# Patient Record
Sex: MALE | Race: WHITE | Employment: OTHER | URBAN - METROPOLITAN AREA
[De-identification: names, ages, dates, MRNs, and addresses within clinical notes are randomized per-mention and may not be internally consistent; named-entity substitution may affect disease eponyms.]

---

## 2017-01-01 ENCOUNTER — APPOINTMENT (OUTPATIENT)
Dept: GENERAL RADIOLOGY | Age: 72
DRG: 208 | End: 2017-01-01
Attending: INTERNAL MEDICINE
Payer: MEDICARE

## 2017-01-01 ENCOUNTER — APPOINTMENT (OUTPATIENT)
Dept: CT IMAGING | Age: 72
DRG: 208 | End: 2017-01-01
Attending: HOSPITALIST
Payer: MEDICARE

## 2017-01-01 ENCOUNTER — APPOINTMENT (OUTPATIENT)
Dept: GENERAL RADIOLOGY | Age: 72
DRG: 208 | End: 2017-01-01
Attending: HOSPITALIST
Payer: MEDICARE

## 2017-01-01 ENCOUNTER — HOSPITAL ENCOUNTER (INPATIENT)
Age: 72
LOS: 2 days | DRG: 208 | End: 2017-02-22
Attending: INTERNAL MEDICINE | Admitting: INTERNAL MEDICINE
Payer: MEDICARE

## 2017-01-01 ENCOUNTER — APPOINTMENT (OUTPATIENT)
Dept: CT IMAGING | Age: 72
DRG: 208 | End: 2017-01-01
Attending: INTERNAL MEDICINE
Payer: MEDICARE

## 2017-01-01 VITALS
BODY MASS INDEX: 30.4 KG/M2 | OXYGEN SATURATION: 82 % | TEMPERATURE: 99 F | DIASTOLIC BLOOD PRESSURE: 71 MMHG | WEIGHT: 189.15 LBS | HEIGHT: 66 IN | SYSTOLIC BLOOD PRESSURE: 123 MMHG

## 2017-01-01 DIAGNOSIS — C34.92 SMALL CELL CARCINOMA OF LEFT LUNG (HCC): ICD-10-CM

## 2017-01-01 LAB
ALBUMIN SERPL BCP-MCNC: 2.4 G/DL (ref 3.5–5)
ALBUMIN/GLOB SERPL: 0.8 {RATIO} (ref 1.1–2.2)
ALBUMIN/GLOB SERPL: 0.8 {RATIO} (ref 1.1–2.2)
ALBUMIN/GLOB SERPL: 0.9 {RATIO} (ref 1.1–2.2)
ALP SERPL-CCNC: 48 U/L (ref 45–117)
ALP SERPL-CCNC: 52 U/L (ref 45–117)
ALP SERPL-CCNC: 61 U/L (ref 45–117)
ALT SERPL-CCNC: 170 U/L (ref 12–78)
ALT SERPL-CCNC: 54 U/L (ref 12–78)
ALT SERPL-CCNC: 93 U/L (ref 12–78)
AMORPH CRY URNS QL MICRO: ABNORMAL
ANION GAP BLD CALC-SCNC: 13 MMOL/L (ref 5–15)
ANION GAP BLD CALC-SCNC: 14 MMOL/L (ref 5–15)
ANION GAP BLD CALC-SCNC: 6 MMOL/L (ref 5–15)
APPEARANCE FLD: ABNORMAL
APPEARANCE UR: ABNORMAL
APTT PPP: 28.5 SEC (ref 22.1–32.5)
ARTERIAL PATENCY WRIST A: YES
AST SERPL W P-5'-P-CCNC: 107 U/L (ref 15–37)
AST SERPL W P-5'-P-CCNC: 167 U/L (ref 15–37)
AST SERPL W P-5'-P-CCNC: 66 U/L (ref 15–37)
ATRIAL RATE: 107 BPM
BACTERIA URNS QL MICRO: NEGATIVE /HPF
BASE EXCESS BLD CALC-SCNC: 6 MMOL/L
BASOPHILS # BLD AUTO: 0 K/UL (ref 0–0.1)
BASOPHILS # BLD AUTO: 0.1 K/UL (ref 0–0.1)
BASOPHILS # BLD: 0 % (ref 0–1)
BASOPHILS # BLD: 2 % (ref 0–1)
BDY SITE: ABNORMAL
BILIRUB SERPL-MCNC: 0.5 MG/DL (ref 0.2–1)
BILIRUB SERPL-MCNC: 0.7 MG/DL (ref 0.2–1)
BILIRUB SERPL-MCNC: 0.8 MG/DL (ref 0.2–1)
BILIRUB UR QL CFM: NEGATIVE
BNP SERPL-MCNC: 89 PG/ML (ref 0–100)
BUN SERPL-MCNC: 14 MG/DL (ref 6–20)
BUN SERPL-MCNC: 16 MG/DL (ref 6–20)
BUN SERPL-MCNC: 21 MG/DL (ref 6–20)
BUN/CREAT SERPL: 10 (ref 12–20)
BUN/CREAT SERPL: 15 (ref 12–20)
BUN/CREAT SERPL: 15 (ref 12–20)
CALCIUM SERPL-MCNC: 8.2 MG/DL (ref 8.5–10.1)
CALCIUM SERPL-MCNC: 8.4 MG/DL (ref 8.5–10.1)
CALCIUM SERPL-MCNC: 8.5 MG/DL (ref 8.5–10.1)
CALCULATED P AXIS, ECG09: 63 DEGREES
CALCULATED R AXIS, ECG10: -53 DEGREES
CALCULATED T AXIS, ECG11: 44 DEGREES
CHLORIDE SERPL-SCNC: 102 MMOL/L (ref 97–108)
CHLORIDE SERPL-SCNC: 98 MMOL/L (ref 97–108)
CHLORIDE SERPL-SCNC: 99 MMOL/L (ref 97–108)
CO2 SERPL-SCNC: 27 MMOL/L (ref 21–32)
CO2 SERPL-SCNC: 30 MMOL/L (ref 21–32)
CO2 SERPL-SCNC: 35 MMOL/L (ref 21–32)
COLOR FLD: ABNORMAL
COLOR UR: ABNORMAL
CREAT SERPL-MCNC: 0.93 MG/DL (ref 0.7–1.3)
CREAT SERPL-MCNC: 1.41 MG/DL (ref 0.7–1.3)
CREAT SERPL-MCNC: 1.57 MG/DL (ref 0.7–1.3)
DIAGNOSIS, 93000: NORMAL
DIFFERENTIAL METHOD BLD: ABNORMAL
EOSINOPHIL # BLD: 0 K/UL (ref 0–0.4)
EOSINOPHIL # BLD: 0 K/UL (ref 0–0.4)
EOSINOPHIL # BLD: 0.1 K/UL (ref 0–0.4)
EOSINOPHIL # BLD: 0.1 K/UL (ref 0–0.4)
EOSINOPHIL NFR BLD: 0 % (ref 0–7)
EOSINOPHIL NFR BLD: 0 % (ref 0–7)
EOSINOPHIL NFR BLD: 1 % (ref 0–7)
EOSINOPHIL NFR BLD: 3 % (ref 0–7)
EPITH CASTS URNS QL MICRO: ABNORMAL /LPF
ERYTHROCYTE [DISTWIDTH] IN BLOOD BY AUTOMATED COUNT: 15.6 % (ref 11.5–14.5)
ERYTHROCYTE [DISTWIDTH] IN BLOOD BY AUTOMATED COUNT: 15.9 % (ref 11.5–14.5)
ERYTHROCYTE [DISTWIDTH] IN BLOOD BY AUTOMATED COUNT: 16 % (ref 11.5–14.5)
ERYTHROCYTE [DISTWIDTH] IN BLOOD BY AUTOMATED COUNT: 16 % (ref 11.5–14.5)
GAS FLOW.O2 O2 DELIVERY SYS: ABNORMAL L/MIN
GAS FLOW.O2 SETTING OXYMISER: 16 BPM
GLOBULIN SER CALC-MCNC: 2.8 G/DL (ref 2–4)
GLOBULIN SER CALC-MCNC: 2.9 G/DL (ref 2–4)
GLOBULIN SER CALC-MCNC: 3 G/DL (ref 2–4)
GLUCOSE BLD STRIP.AUTO-MCNC: 124 MG/DL (ref 65–100)
GLUCOSE BLD STRIP.AUTO-MCNC: 125 MG/DL (ref 65–100)
GLUCOSE BLD STRIP.AUTO-MCNC: 137 MG/DL (ref 65–100)
GLUCOSE BLD STRIP.AUTO-MCNC: 163 MG/DL (ref 65–100)
GLUCOSE BLD STRIP.AUTO-MCNC: 180 MG/DL (ref 65–100)
GLUCOSE SERPL-MCNC: 113 MG/DL (ref 65–100)
GLUCOSE SERPL-MCNC: 174 MG/DL (ref 65–100)
GLUCOSE SERPL-MCNC: 77 MG/DL (ref 65–100)
GLUCOSE UR STRIP.AUTO-MCNC: NEGATIVE MG/DL
GRAN CASTS URNS QL MICRO: ABNORMAL /LPF
HCO3 BLD-SCNC: 31.6 MMOL/L (ref 22–26)
HCT VFR BLD AUTO: 27 % (ref 36.6–50.3)
HCT VFR BLD AUTO: 30.2 % (ref 36.6–50.3)
HCT VFR BLD AUTO: 31.8 % (ref 36.6–50.3)
HCT VFR BLD AUTO: 47 % (ref 36.6–50.3)
HGB BLD-MCNC: 15.3 G/DL (ref 12.1–17)
HGB BLD-MCNC: 8.5 G/DL (ref 12.1–17)
HGB BLD-MCNC: 9.7 G/DL (ref 12.1–17)
HGB BLD-MCNC: 9.9 G/DL (ref 12.1–17)
HGB UR QL STRIP: ABNORMAL
INR PPP: 1.2 (ref 0.9–1.1)
KETONES UR QL STRIP.AUTO: NEGATIVE MG/DL
LACTATE SERPL-SCNC: 1.4 MMOL/L (ref 0.4–2)
LACTATE SERPL-SCNC: 2 MMOL/L (ref 0.4–2)
LEUKOCYTE ESTERASE UR QL STRIP.AUTO: NEGATIVE
LYMPHOCYTES # BLD AUTO: 14 % (ref 12–49)
LYMPHOCYTES # BLD AUTO: 24 % (ref 12–49)
LYMPHOCYTES # BLD AUTO: 36 % (ref 12–49)
LYMPHOCYTES # BLD AUTO: 37 % (ref 12–49)
LYMPHOCYTES # BLD: 0.8 K/UL (ref 0.8–3.5)
LYMPHOCYTES # BLD: 1.2 K/UL (ref 0.8–3.5)
LYMPHOCYTES # BLD: 1.2 K/UL (ref 0.8–3.5)
LYMPHOCYTES # BLD: 2.1 K/UL (ref 0.8–3.5)
MAGNESIUM SERPL-MCNC: 1.9 MG/DL (ref 1.6–2.4)
MAGNESIUM SERPL-MCNC: 2.2 MG/DL (ref 1.6–2.4)
MCH RBC QN AUTO: 30 PG (ref 26–34)
MCH RBC QN AUTO: 30.5 PG (ref 26–34)
MCH RBC QN AUTO: 30.6 PG (ref 26–34)
MCH RBC QN AUTO: 31.8 PG (ref 26–34)
MCHC RBC AUTO-ENTMCNC: 31.1 G/DL (ref 30–36.5)
MCHC RBC AUTO-ENTMCNC: 31.5 G/DL (ref 30–36.5)
MCHC RBC AUTO-ENTMCNC: 32.1 G/DL (ref 30–36.5)
MCHC RBC AUTO-ENTMCNC: 32.6 G/DL (ref 30–36.5)
MCV RBC AUTO: 95.3 FL (ref 80–99)
MCV RBC AUTO: 95.4 FL (ref 80–99)
MCV RBC AUTO: 97.7 FL (ref 80–99)
MCV RBC AUTO: 97.8 FL (ref 80–99)
METAMYELOCYTES NFR BLD MANUAL: 2 %
METAMYELOCYTES NFR BLD MANUAL: 2 %
METAMYELOCYTES NFR BLD MANUAL: 5 %
MONOCYTES # BLD: 0.1 K/UL (ref 0–1)
MONOCYTES # BLD: 0.6 K/UL (ref 0–1)
MONOCYTES # BLD: 0.6 K/UL (ref 0–1)
MONOCYTES # BLD: 0.8 K/UL (ref 0–1)
MONOCYTES NFR BLD AUTO: 10 % (ref 5–13)
MONOCYTES NFR BLD AUTO: 10 % (ref 5–13)
MONOCYTES NFR BLD AUTO: 23 % (ref 5–13)
MONOCYTES NFR BLD AUTO: 3 % (ref 5–13)
MYELOCYTES NFR BLD MANUAL: 3 %
MYELOCYTES NFR BLD MANUAL: 4 %
MYELOCYTES NFR BLD MANUAL: 6 %
NEUTS BAND NFR BLD MANUAL: 13 % (ref 0–6)
NEUTS BAND NFR BLD MANUAL: 4 %
NEUTS BAND NFR BLD MANUAL: 5 % (ref 0–6)
NEUTS BAND NFR BLD MANUAL: 8 % (ref 0–6)
NEUTS SEG # BLD: 1.2 K/UL (ref 1.8–8)
NEUTS SEG # BLD: 2.4 K/UL (ref 1.8–8)
NEUTS SEG # BLD: 3.1 K/UL (ref 1.8–8)
NEUTS SEG # BLD: 4.1 K/UL (ref 1.8–8)
NEUTS SEG NFR BLD AUTO: 31 % (ref 32–75)
NEUTS SEG NFR BLD AUTO: 37 % (ref 32–75)
NEUTS SEG NFR BLD AUTO: 56 % (ref 32–75)
NEUTS SEG NFR BLD AUTO: 57 % (ref 32–75)
NITRITE UR QL STRIP.AUTO: NEGATIVE
NRBC # BLD: 0.1 K/UL (ref 0–0.01)
NRBC # BLD: 0.4 K/UL (ref 0–0.01)
NRBC BLD-RTO: 1.6 PER 100 WBC
NRBC BLD-RTO: 6.9 PER 100 WBC
NUC CELL # FLD: 43 /CU MM (ref 0–5)
O2/TOTAL GAS SETTING VFR VENT: 80 %
OTHER CELL,FOTHC: 0 %
P-R INTERVAL, ECG05: 126 MS
PCO2 BLD: 57.6 MMHG (ref 35–45)
PEEP RESPIRATORY: 5 CMH2O
PH BLD: 7.35 [PH] (ref 7.35–7.45)
PH UR STRIP: 5 [PH] (ref 5–8)
PHOSPHATE SERPL-MCNC: 3.7 MG/DL (ref 2.6–4.7)
PHOSPHATE SERPL-MCNC: 5.9 MG/DL (ref 2.6–4.7)
PLATELET # BLD AUTO: 131 K/UL (ref 150–400)
PLATELET # BLD AUTO: 170 K/UL (ref 150–400)
PLATELET # BLD AUTO: 207 K/UL (ref 150–400)
PLATELET # BLD AUTO: 238 K/UL (ref 150–400)
PO2 BLD: 80 MMHG (ref 80–100)
POTASSIUM SERPL-SCNC: 3.3 MMOL/L (ref 3.5–5.1)
POTASSIUM SERPL-SCNC: 3.4 MMOL/L (ref 3.5–5.1)
POTASSIUM SERPL-SCNC: 3.6 MMOL/L (ref 3.5–5.1)
PROT SERPL-MCNC: 5.2 G/DL (ref 6.4–8.2)
PROT SERPL-MCNC: 5.3 G/DL (ref 6.4–8.2)
PROT SERPL-MCNC: 5.4 G/DL (ref 6.4–8.2)
PROT UR STRIP-MCNC: 100 MG/DL
PROTHROMBIN TIME: 12.2 SEC (ref 9–11.1)
Q-T INTERVAL, ECG07: 372 MS
QRS DURATION, ECG06: 134 MS
QTC CALCULATION (BEZET), ECG08: 496 MS
RBC # BLD AUTO: 2.83 M/UL (ref 4.1–5.7)
RBC # BLD AUTO: 3.17 M/UL (ref 4.1–5.7)
RBC # BLD AUTO: 3.25 M/UL (ref 4.1–5.7)
RBC # BLD AUTO: 4.81 M/UL (ref 4.1–5.7)
RBC # FLD: >100 /CU MM
RBC #/AREA URNS HPF: ABNORMAL /HPF (ref 0–5)
RBC MORPH BLD: ABNORMAL
SAO2 % BLD: 95 % (ref 92–97)
SERVICE CMNT-IMP: ABNORMAL
SODIUM SERPL-SCNC: 139 MMOL/L (ref 136–145)
SODIUM SERPL-SCNC: 142 MMOL/L (ref 136–145)
SODIUM SERPL-SCNC: 143 MMOL/L (ref 136–145)
SP GR UR REFRACTOMETRY: 1.02 (ref 1–1.03)
SPECIMEN SOURCE FLD: ABNORMAL
SPECIMEN TYPE: ABNORMAL
THERAPEUTIC RANGE,PTTT: NORMAL SECS (ref 58–77)
TOTAL CELLS COUNTED SPEC: 0
TOTAL RESP. RATE, ITRR: 16
TROPONIN I SERPL-MCNC: 0.07 NG/ML
UA: UC IF INDICATED,UAUC: ABNORMAL
UROBILINOGEN UR QL STRIP.AUTO: 1 EU/DL (ref 0.2–1)
VENTILATION MODE VENT: ABNORMAL
VENTRICULAR RATE, ECG03: 107 BPM
VT SETTING VENT: 500 ML
WBC # BLD AUTO: 3.4 K/UL (ref 4.1–11.1)
WBC # BLD AUTO: 4.8 K/UL (ref 4.1–11.1)
WBC # BLD AUTO: 5.8 K/UL (ref 4.1–11.1)
WBC # BLD AUTO: 5.9 K/UL (ref 4.1–11.1)
WBC MORPH BLD: ABNORMAL
WBC NRBC COR # BLD: ABNORMAL 10*3/UL
WBC NRBC COR # BLD: ABNORMAL 10*3/UL
WBC URNS QL MICRO: ABNORMAL /HPF (ref 0–4)

## 2017-01-01 PROCEDURE — 85025 COMPLETE CBC W/AUTO DIFF WBC: CPT | Performed by: INTERNAL MEDICINE

## 2017-01-01 PROCEDURE — 36415 COLL VENOUS BLD VENIPUNCTURE: CPT | Performed by: INTERNAL MEDICINE

## 2017-01-01 PROCEDURE — 71020 XR CHEST PA LAT: CPT

## 2017-01-01 PROCEDURE — 74011636320 HC RX REV CODE- 636/320: Performed by: INTERNAL MEDICINE

## 2017-01-01 PROCEDURE — 74011000250 HC RX REV CODE- 250: Performed by: HOSPITALIST

## 2017-01-01 PROCEDURE — 85610 PROTHROMBIN TIME: CPT | Performed by: INTERNAL MEDICINE

## 2017-01-01 PROCEDURE — 87040 BLOOD CULTURE FOR BACTERIA: CPT | Performed by: INTERNAL MEDICINE

## 2017-01-01 PROCEDURE — 82962 GLUCOSE BLOOD TEST: CPT

## 2017-01-01 PROCEDURE — 51798 US URINE CAPACITY MEASURE: CPT

## 2017-01-01 PROCEDURE — 93005 ELECTROCARDIOGRAM TRACING: CPT

## 2017-01-01 PROCEDURE — 65610000006 HC RM INTENSIVE CARE

## 2017-01-01 PROCEDURE — 82803 BLOOD GASES ANY COMBINATION: CPT

## 2017-01-01 PROCEDURE — 84100 ASSAY OF PHOSPHORUS: CPT | Performed by: INTERNAL MEDICINE

## 2017-01-01 PROCEDURE — 71010 XR CHEST PORT: CPT

## 2017-01-01 PROCEDURE — 74011250637 HC RX REV CODE- 250/637: Performed by: INTERNAL MEDICINE

## 2017-01-01 PROCEDURE — 74174 CTA ABD&PLVS W/CONTRAST: CPT

## 2017-01-01 PROCEDURE — 74011250636 HC RX REV CODE- 250/636: Performed by: INTERNAL MEDICINE

## 2017-01-01 PROCEDURE — 31500 INSERT EMERGENCY AIRWAY: CPT

## 2017-01-01 PROCEDURE — 80053 COMPREHEN METABOLIC PANEL: CPT | Performed by: HOSPITALIST

## 2017-01-01 PROCEDURE — 80053 COMPREHEN METABOLIC PANEL: CPT | Performed by: INTERNAL MEDICINE

## 2017-01-01 PROCEDURE — 83735 ASSAY OF MAGNESIUM: CPT | Performed by: INTERNAL MEDICINE

## 2017-01-01 PROCEDURE — 5A1945Z RESPIRATORY VENTILATION, 24-96 CONSECUTIVE HOURS: ICD-10-PCS | Performed by: HOSPITALIST

## 2017-01-01 PROCEDURE — 31622 DX BRONCHOSCOPE/WASH: CPT

## 2017-01-01 PROCEDURE — 74011000258 HC RX REV CODE- 258: Performed by: INTERNAL MEDICINE

## 2017-01-01 PROCEDURE — 4A10X4Z MONITORING OF CENTRAL NERVOUS ELECTRICAL ACTIVITY, EXTERNAL APPROACH: ICD-10-PCS | Performed by: PSYCHIATRY & NEUROLOGY

## 2017-01-01 PROCEDURE — 0B9B8ZX DRAINAGE OF LEFT LOWER LOBE BRONCHUS, VIA NATURAL OR ARTIFICIAL OPENING ENDOSCOPIC, DIAGNOSTIC: ICD-10-PCS | Performed by: INTERNAL MEDICINE

## 2017-01-01 PROCEDURE — 85730 THROMBOPLASTIN TIME PARTIAL: CPT | Performed by: INTERNAL MEDICINE

## 2017-01-01 PROCEDURE — 87116 MYCOBACTERIA CULTURE: CPT | Performed by: INTERNAL MEDICINE

## 2017-01-01 PROCEDURE — 83605 ASSAY OF LACTIC ACID: CPT | Performed by: INTERNAL MEDICINE

## 2017-01-01 PROCEDURE — 74011000250 HC RX REV CODE- 250: Performed by: INTERNAL MEDICINE

## 2017-01-01 PROCEDURE — 89050 BODY FLUID CELL COUNT: CPT | Performed by: INTERNAL MEDICINE

## 2017-01-01 PROCEDURE — 74011250636 HC RX REV CODE- 250/636: Performed by: NURSE PRACTITIONER

## 2017-01-01 PROCEDURE — 0B988ZX DRAINAGE OF LEFT UPPER LOBE BRONCHUS, VIA NATURAL OR ARTIFICIAL OPENING ENDOSCOPIC, DIAGNOSTIC: ICD-10-PCS | Performed by: INTERNAL MEDICINE

## 2017-01-01 PROCEDURE — 87070 CULTURE OTHR SPECIMN AEROBIC: CPT | Performed by: INTERNAL MEDICINE

## 2017-01-01 PROCEDURE — 0BH17EZ INSERTION OF ENDOTRACHEAL AIRWAY INTO TRACHEA, VIA NATURAL OR ARTIFICIAL OPENING: ICD-10-PCS | Performed by: ANESTHESIOLOGY

## 2017-01-01 PROCEDURE — 36600 WITHDRAWAL OF ARTERIAL BLOOD: CPT

## 2017-01-01 PROCEDURE — 70450 CT HEAD/BRAIN W/O DYE: CPT

## 2017-01-01 PROCEDURE — 83880 ASSAY OF NATRIURETIC PEPTIDE: CPT | Performed by: HOSPITALIST

## 2017-01-01 PROCEDURE — 77030034850

## 2017-01-01 PROCEDURE — 94003 VENT MGMT INPAT SUBQ DAY: CPT

## 2017-01-01 PROCEDURE — 84484 ASSAY OF TROPONIN QUANT: CPT | Performed by: HOSPITALIST

## 2017-01-01 PROCEDURE — 77030018836 HC SOL IRR NACL ICUM -A

## 2017-01-01 PROCEDURE — 65270000032 HC RM SEMIPRIVATE

## 2017-01-01 PROCEDURE — 74011250636 HC RX REV CODE- 250/636: Performed by: HOSPITALIST

## 2017-01-01 PROCEDURE — 74011250637 HC RX REV CODE- 250/637: Performed by: NURSE PRACTITIONER

## 2017-01-01 PROCEDURE — 81001 URINALYSIS AUTO W/SCOPE: CPT | Performed by: INTERNAL MEDICINE

## 2017-01-01 PROCEDURE — 95816 EEG AWAKE AND DROWSY: CPT | Performed by: INTERNAL MEDICINE

## 2017-01-01 PROCEDURE — 71275 CT ANGIOGRAPHY CHEST: CPT

## 2017-01-01 PROCEDURE — 74011636637 HC RX REV CODE- 636/637: Performed by: INTERNAL MEDICINE

## 2017-01-01 PROCEDURE — 77010033678 HC OXYGEN DAILY

## 2017-01-01 PROCEDURE — 87102 FUNGUS ISOLATION CULTURE: CPT | Performed by: INTERNAL MEDICINE

## 2017-01-01 PROCEDURE — 94002 VENT MGMT INPAT INIT DAY: CPT

## 2017-01-01 PROCEDURE — 5A12012 PERFORMANCE OF CARDIAC OUTPUT, SINGLE, MANUAL: ICD-10-PCS | Performed by: HOSPITALIST

## 2017-01-01 RX ORDER — MAGNESIUM SULFATE 100 %
4 CRYSTALS MISCELLANEOUS AS NEEDED
Status: DISCONTINUED | OUTPATIENT
Start: 2017-01-01 | End: 2017-01-01

## 2017-01-01 RX ORDER — DEXTROSE MONOHYDRATE 50 MG/ML
50 INJECTION, SOLUTION INTRAVENOUS
Status: DISPENSED | OUTPATIENT
Start: 2017-01-01 | End: 2017-01-01

## 2017-01-01 RX ORDER — LIDOCAINE HYDROCHLORIDE ANHYDROUS AND DEXTROSE MONOHYDRATE .8; 5 G/100ML; G/100ML
1-4 INJECTION, SOLUTION INTRAVENOUS
Status: DISCONTINUED | OUTPATIENT
Start: 2017-01-01 | End: 2017-01-01

## 2017-01-01 RX ORDER — DEXAMETHASONE SODIUM PHOSPHATE 4 MG/ML
2 INJECTION, SOLUTION INTRA-ARTICULAR; INTRALESIONAL; INTRAMUSCULAR; INTRAVENOUS; SOFT TISSUE
Status: DISCONTINUED | OUTPATIENT
Start: 2017-01-01 | End: 2017-01-01 | Stop reason: HOSPADM

## 2017-01-01 RX ORDER — DOPAMINE HYDROCHLORIDE 320 MG/100ML
2-5 INJECTION, SOLUTION INTRAVENOUS
Status: DISCONTINUED | OUTPATIENT
Start: 2017-01-01 | End: 2017-01-01

## 2017-01-01 RX ORDER — OXYCODONE HCL 20 MG/ML
10 CONCENTRATE, ORAL ORAL
Status: DISCONTINUED | OUTPATIENT
Start: 2017-01-01 | End: 2017-01-01

## 2017-01-01 RX ORDER — FENTANYL CITRATE 50 UG/ML
50-100 INJECTION, SOLUTION INTRAMUSCULAR; INTRAVENOUS
Status: DISCONTINUED | OUTPATIENT
Start: 2017-01-01 | End: 2017-01-01

## 2017-01-01 RX ORDER — DEXTROSE 50 % IN WATER (D50W) INTRAVENOUS SYRINGE
12.5-25 AS NEEDED
Status: DISCONTINUED | OUTPATIENT
Start: 2017-01-01 | End: 2017-01-01

## 2017-01-01 RX ORDER — LORAZEPAM 2 MG/ML
2 INJECTION INTRAMUSCULAR
Status: DISCONTINUED | OUTPATIENT
Start: 2017-01-01 | End: 2017-01-01 | Stop reason: HOSPADM

## 2017-01-01 RX ORDER — MORPHINE SULFATE 2 MG/ML
2 INJECTION, SOLUTION INTRAMUSCULAR; INTRAVENOUS
Status: DISCONTINUED | OUTPATIENT
Start: 2017-01-01 | End: 2017-01-01 | Stop reason: HOSPADM

## 2017-01-01 RX ORDER — VERAPAMIL HYDROCHLORIDE 2.5 MG/ML
2.5 INJECTION, SOLUTION INTRAVENOUS
Status: DISCONTINUED | OUTPATIENT
Start: 2017-01-01 | End: 2017-01-01

## 2017-01-01 RX ORDER — AMIODARONE HYDROCHLORIDE 150 MG/3ML
150 INJECTION, SOLUTION INTRAVENOUS
Status: DISCONTINUED | OUTPATIENT
Start: 2017-01-01 | End: 2017-01-01

## 2017-01-01 RX ORDER — PROCAINAMIDE HYDROCHLORIDE 500 MG/ML
500 INJECTION INTRAMUSCULAR; INTRAVENOUS
Status: DISCONTINUED | OUTPATIENT
Start: 2017-01-01 | End: 2017-01-01

## 2017-01-01 RX ORDER — HEPARIN 100 UNIT/ML
300-500 SYRINGE INTRAVENOUS AS NEEDED
Status: DISCONTINUED | OUTPATIENT
Start: 2017-01-01 | End: 2017-01-01

## 2017-01-01 RX ORDER — SODIUM CHLORIDE 9 MG/ML
1 INJECTION, SOLUTION INTRAVENOUS
Status: DISCONTINUED | OUTPATIENT
Start: 2017-01-01 | End: 2017-01-01

## 2017-01-01 RX ORDER — ALBUTEROL SULFATE 0.83 MG/ML
2.5 SOLUTION RESPIRATORY (INHALATION)
Status: DISCONTINUED | OUTPATIENT
Start: 2017-01-01 | End: 2017-01-01 | Stop reason: HOSPADM

## 2017-01-01 RX ORDER — ADENOSINE 3 MG/ML
6 INJECTION, SOLUTION INTRAVENOUS
Status: DISCONTINUED | OUTPATIENT
Start: 2017-01-01 | End: 2017-01-01

## 2017-01-01 RX ORDER — PROCHLORPERAZINE MALEATE 10 MG
10 TABLET ORAL
Status: DISCONTINUED | OUTPATIENT
Start: 2017-01-01 | End: 2017-01-01

## 2017-01-01 RX ORDER — ONDANSETRON 2 MG/ML
4 INJECTION INTRAMUSCULAR; INTRAVENOUS
Status: DISCONTINUED | OUTPATIENT
Start: 2017-01-01 | End: 2017-01-01 | Stop reason: HOSPADM

## 2017-01-01 RX ORDER — MAGNESIUM SULFATE HEPTAHYDRATE 40 MG/ML
2 INJECTION, SOLUTION INTRAVENOUS
Status: DISCONTINUED | OUTPATIENT
Start: 2017-01-01 | End: 2017-01-01

## 2017-01-01 RX ORDER — SODIUM CHLORIDE 0.9 % (FLUSH) 0.9 %
10 SYRINGE (ML) INJECTION
Status: COMPLETED | OUTPATIENT
Start: 2017-01-01 | End: 2017-01-01

## 2017-01-01 RX ORDER — SODIUM CHLORIDE 0.9 % (FLUSH) 0.9 %
10-30 SYRINGE (ML) INJECTION AS NEEDED
Status: DISCONTINUED | OUTPATIENT
Start: 2017-01-01 | End: 2017-01-01

## 2017-01-01 RX ORDER — OXYCODONE HYDROCHLORIDE 5 MG/1
5 TABLET ORAL
Status: DISCONTINUED | OUTPATIENT
Start: 2017-01-01 | End: 2017-01-01

## 2017-01-01 RX ORDER — CALCIUM CHLORIDE INJECTION 100 MG/ML
1 INJECTION, SOLUTION INTRAVENOUS
Status: DISCONTINUED | OUTPATIENT
Start: 2017-01-01 | End: 2017-01-01

## 2017-01-01 RX ORDER — HALOPERIDOL 5 MG/ML
2 INJECTION INTRAMUSCULAR
Status: DISCONTINUED | OUTPATIENT
Start: 2017-01-01 | End: 2017-01-01 | Stop reason: HOSPADM

## 2017-01-01 RX ORDER — PROCHLORPERAZINE EDISYLATE 5 MG/ML
10 INJECTION INTRAMUSCULAR; INTRAVENOUS
Status: DISCONTINUED | OUTPATIENT
Start: 2017-01-01 | End: 2017-01-01 | Stop reason: SDUPTHER

## 2017-01-01 RX ORDER — LIDOCAINE HYDROCHLORIDE 20 MG/ML
JELLY TOPICAL
Status: DISCONTINUED | OUTPATIENT
Start: 2017-01-01 | End: 2017-01-01

## 2017-01-01 RX ORDER — INSULIN LISPRO 100 [IU]/ML
INJECTION, SOLUTION INTRAVENOUS; SUBCUTANEOUS EVERY 6 HOURS
Status: DISCONTINUED | OUTPATIENT
Start: 2017-01-01 | End: 2017-01-01

## 2017-01-01 RX ORDER — NALOXONE HYDROCHLORIDE 0.4 MG/ML
0.4 INJECTION, SOLUTION INTRAMUSCULAR; INTRAVENOUS; SUBCUTANEOUS
Status: DISCONTINUED | OUTPATIENT
Start: 2017-01-01 | End: 2017-01-01

## 2017-01-01 RX ORDER — SODIUM CHLORIDE 0.9 % (FLUSH) 0.9 %
10-40 SYRINGE (ML) INJECTION EVERY 8 HOURS
Status: DISCONTINUED | OUTPATIENT
Start: 2017-01-01 | End: 2017-01-01

## 2017-01-01 RX ORDER — ATROPINE SULFATE 0.1 MG/ML
1 INJECTION INTRAVENOUS
Status: DISCONTINUED | OUTPATIENT
Start: 2017-01-01 | End: 2017-01-01

## 2017-01-01 RX ORDER — HALOPERIDOL 2 MG/ML
2 SOLUTION ORAL
Status: DISCONTINUED | OUTPATIENT
Start: 2017-01-01 | End: 2017-01-01 | Stop reason: HOSPADM

## 2017-01-01 RX ORDER — DEXTROSE 50 % IN WATER (D50W) INTRAVENOUS SYRINGE
25
Status: DISPENSED | OUTPATIENT
Start: 2017-01-01 | End: 2017-01-01

## 2017-01-01 RX ORDER — CHLORHEXIDINE GLUCONATE 1.2 MG/ML
15 RINSE ORAL 2 TIMES DAILY
Status: DISCONTINUED | OUTPATIENT
Start: 2017-01-01 | End: 2017-01-01

## 2017-01-01 RX ORDER — SCOLOPAMINE TRANSDERMAL SYSTEM 1 MG/1
1.5 PATCH, EXTENDED RELEASE TRANSDERMAL
Status: DISCONTINUED | OUTPATIENT
Start: 2017-01-01 | End: 2017-01-01 | Stop reason: HOSPADM

## 2017-01-01 RX ORDER — SODIUM BICARBONATE 1 MEQ/ML
50 SYRINGE (ML) INTRAVENOUS
Status: DISCONTINUED | OUTPATIENT
Start: 2017-01-01 | End: 2017-01-01

## 2017-01-01 RX ORDER — LOPERAMIDE HYDROCHLORIDE 2 MG/1
2 CAPSULE ORAL
Status: DISCONTINUED | OUTPATIENT
Start: 2017-01-01 | End: 2017-01-01

## 2017-01-01 RX ORDER — SODIUM CHLORIDE 9 MG/ML
150 INJECTION, SOLUTION INTRAVENOUS CONTINUOUS
Status: DISCONTINUED | OUTPATIENT
Start: 2017-01-01 | End: 2017-01-01 | Stop reason: HOSPADM

## 2017-01-01 RX ORDER — MAGNESIUM SULFATE HEPTAHYDRATE 500 MG/ML
1 INJECTION, SOLUTION INTRAMUSCULAR; INTRAVENOUS
Status: DISCONTINUED | OUTPATIENT
Start: 2017-01-01 | End: 2017-01-01

## 2017-01-01 RX ORDER — EPINEPHRINE 0.1 MG/ML
1 INJECTION INTRACARDIAC; INTRAVENOUS
Status: DISCONTINUED | OUTPATIENT
Start: 2017-01-01 | End: 2017-01-01

## 2017-01-01 RX ORDER — ACETAMINOPHEN 650 MG/1
650 SUPPOSITORY RECTAL
Status: DISCONTINUED | OUTPATIENT
Start: 2017-01-01 | End: 2017-01-01 | Stop reason: HOSPADM

## 2017-01-01 RX ORDER — LIDOCAINE HCL/PF 100 MG/5ML
1-1.5 SYRINGE (ML) INTRAVENOUS
Status: DISCONTINUED | OUTPATIENT
Start: 2017-01-01 | End: 2017-01-01

## 2017-01-01 RX ORDER — GLYCOPYRROLATE 0.2 MG/ML
0.2 INJECTION INTRAMUSCULAR; INTRAVENOUS
Status: DISCONTINUED | OUTPATIENT
Start: 2017-01-01 | End: 2017-01-01 | Stop reason: HOSPADM

## 2017-01-01 RX ORDER — NOREPINEPHRINE BITARTRATE/D5W 8 MG/250ML
2-16 PLASTIC BAG, INJECTION (ML) INTRAVENOUS
Status: DISCONTINUED | OUTPATIENT
Start: 2017-01-01 | End: 2017-01-01

## 2017-01-01 RX ORDER — SODIUM CHLORIDE 0.9 % (FLUSH) 0.9 %
SYRINGE (ML) INJECTION
Status: DISPENSED
Start: 2017-01-01 | End: 2017-01-01

## 2017-01-01 RX ORDER — POTASSIUM CHLORIDE 14.9 MG/ML
10 INJECTION INTRAVENOUS ONCE
Status: COMPLETED | OUTPATIENT
Start: 2017-01-01 | End: 2017-01-01

## 2017-01-01 RX ORDER — LORAZEPAM 2 MG/ML
1 INJECTION INTRAMUSCULAR
Status: DISCONTINUED | OUTPATIENT
Start: 2017-01-01 | End: 2017-01-01 | Stop reason: HOSPADM

## 2017-01-01 RX ADMIN — Medication 10 ML: at 16:36

## 2017-01-01 RX ADMIN — GLYCOPYRROLATE 0.2 MG: 0.2 INJECTION INTRAMUSCULAR; INTRAVENOUS at 12:12

## 2017-01-01 RX ADMIN — Medication 10 ML: at 10:50

## 2017-01-01 RX ADMIN — SODIUM CHLORIDE 75 ML/HR: 900 INJECTION, SOLUTION INTRAVENOUS at 07:19

## 2017-01-01 RX ADMIN — CHLORHEXIDINE GLUCONATE 15 ML: 1.2 RINSE ORAL at 18:11

## 2017-01-01 RX ADMIN — Medication 10 ML: at 04:22

## 2017-01-01 RX ADMIN — NOREPINEPHRINE BITARTRATE 5 MCG/MIN: 1 INJECTION, SOLUTION, CONCENTRATE INTRAVENOUS at 09:33

## 2017-01-01 RX ADMIN — FAMOTIDINE 20 MG: 10 INJECTION, SOLUTION INTRAVENOUS at 08:13

## 2017-01-01 RX ADMIN — SODIUM BICARBONATE 50 MEQ: 84 INJECTION, SOLUTION INTRAVENOUS at 09:01

## 2017-01-01 RX ADMIN — PIPERACILLIN SODIUM AND TAZOBACTAM SODIUM 3.38 G: 3; .375 INJECTION, POWDER, LYOPHILIZED, FOR SOLUTION INTRAVENOUS at 16:27

## 2017-01-01 RX ADMIN — EPINEPHRINE 1 MG: 0.1 INJECTION, SOLUTION ENDOTRACHEAL; INTRACARDIAC; INTRAVENOUS at 08:58

## 2017-01-01 RX ADMIN — SODIUM CHLORIDE 100 ML: 900 INJECTION, SOLUTION INTRAVENOUS at 12:09

## 2017-01-01 RX ADMIN — FAMOTIDINE 20 MG: 10 INJECTION, SOLUTION INTRAVENOUS at 21:39

## 2017-01-01 RX ADMIN — SODIUM CHLORIDE 75 ML/HR: 900 INJECTION, SOLUTION INTRAVENOUS at 04:28

## 2017-01-01 RX ADMIN — Medication 10 ML: at 04:28

## 2017-01-01 RX ADMIN — LORAZEPAM 1 MG: 2 INJECTION INTRAMUSCULAR; INTRAVENOUS at 11:51

## 2017-01-01 RX ADMIN — Medication 10 ML: at 06:53

## 2017-01-01 RX ADMIN — CHLORHEXIDINE GLUCONATE 15 ML: 1.2 RINSE ORAL at 12:48

## 2017-01-01 RX ADMIN — FAMOTIDINE 20 MG: 10 INJECTION, SOLUTION INTRAVENOUS at 11:08

## 2017-01-01 RX ADMIN — LORAZEPAM 2 MG: 2 INJECTION INTRAMUSCULAR; INTRAVENOUS at 12:11

## 2017-01-01 RX ADMIN — Medication 10 ML: at 14:03

## 2017-01-01 RX ADMIN — INSULIN LISPRO 2 UNITS: 100 INJECTION, SOLUTION INTRAVENOUS; SUBCUTANEOUS at 11:19

## 2017-01-01 RX ADMIN — PIPERACILLIN SODIUM AND TAZOBACTAM SODIUM 3.38 G: 3; .375 INJECTION, POWDER, LYOPHILIZED, FOR SOLUTION INTRAVENOUS at 21:40

## 2017-01-01 RX ADMIN — Medication 2 MG: at 12:11

## 2017-01-01 RX ADMIN — IOPAMIDOL 100 ML: 755 INJECTION, SOLUTION INTRAVENOUS at 12:09

## 2017-01-01 RX ADMIN — CHLORHEXIDINE GLUCONATE 15 ML: 1.2 RINSE ORAL at 08:24

## 2017-01-01 RX ADMIN — Medication 10 ML: at 22:48

## 2017-01-01 RX ADMIN — Medication 10 ML: at 21:40

## 2017-01-01 RX ADMIN — Medication 10 ML: at 05:08

## 2017-01-01 RX ADMIN — APIXABAN 5 MG: 5 TABLET, FILM COATED ORAL at 21:00

## 2017-01-01 RX ADMIN — Medication 10 ML: at 04:29

## 2017-01-01 RX ADMIN — HALOPERIDOL LACTATE 2 MG: 5 INJECTION, SOLUTION INTRAMUSCULAR at 12:11

## 2017-01-01 RX ADMIN — ACETAMINOPHEN 650 MG: 650 SUPPOSITORY RECTAL at 16:45

## 2017-01-01 RX ADMIN — PIPERACILLIN SODIUM AND TAZOBACTAM SODIUM 3.38 G: 3; .375 INJECTION, POWDER, LYOPHILIZED, FOR SOLUTION INTRAVENOUS at 05:40

## 2017-01-01 RX ADMIN — OXYCODONE HYDROCHLORIDE 5 MG: 5 TABLET ORAL at 23:26

## 2017-01-01 RX ADMIN — OXYCODONE HYDROCHLORIDE 10 MG: 100 SOLUTION ORAL at 03:59

## 2017-01-01 RX ADMIN — POTASSIUM CHLORIDE 10 MEQ: 200 INJECTION, SOLUTION INTRAVENOUS at 06:49

## 2017-01-01 RX ADMIN — PIPERACILLIN SODIUM,TAZOBACTAM SODIUM 3.38 G: 3; .375 INJECTION, POWDER, FOR SOLUTION INTRAVENOUS at 11:40

## 2017-01-01 RX ADMIN — SODIUM CHLORIDE 75 ML/HR: 900 INJECTION, SOLUTION INTRAVENOUS at 14:05

## 2017-01-01 RX ADMIN — SODIUM CHLORIDE 75 ML/HR: 900 INJECTION, SOLUTION INTRAVENOUS at 16:36

## 2017-01-01 RX ADMIN — Medication 2 MG: at 11:52

## 2017-02-20 PROBLEM — E86.0 DEHYDRATION: Status: ACTIVE | Noted: 2017-01-01

## 2017-02-20 PROBLEM — R19.7 DIARRHEA: Status: ACTIVE | Noted: 2017-01-01

## 2017-02-20 PROBLEM — R06.02 SOB (SHORTNESS OF BREATH): Status: ACTIVE | Noted: 2017-01-01

## 2017-02-20 PROBLEM — R53.83 FATIGUE: Status: ACTIVE | Noted: 2017-01-01

## 2017-02-20 NOTE — H&P
Mr. Antonio Mccrary is p/o Dr. Jamaal Ash, who was diagnosed with extensive small cell lung cancer after biopsy on 4/27/16 of a OLAMIDE mass found to be accompanied by extensive adenopathy including L retrocrural adenopathy. He was also incidentally found to have had pulmonary embolism on staging  imaging for which he was started on anticoagulation. Palliative chemotherapy with Cisplatin/etoposide was started 5/16/16. Completed 4 cycles with persistently depressed performance status and partial response. In 10/2016, he showed progression of the L upper lobe mass and started Opdivo. In 1/2017, Now he is on weekly paclitaxel for convincing signs of progression. Today Mr. Antonio Mccrary is in office for his next Taxol treatment. But, he/his wife reported that he is feeling extreme fatigue, increased SOB, feeling dry, and started having diarrhea again last week. Denies any blood in the stool. He is currently on Eliquis BID. Held his treatment today. Offered fluids at [facility] for couple of days. Mrs. Antonio Mccrary preferred him to be admitted for fluids, rather than bringing him to office daily as she goes to work, and is worried about him being at home when he is not doing so well. His vitals are stable with Sats of 90% at O2 of 2 /12 L NC. His BP is at 91/60; He will be admitted for fluids, and Diarrhea control; Will do CXR to check for increased SOB; and c-diff panel for uncontrolled diarrhea. VS: 97.6; 77; 18; 91/60; 90%    Labs: WBC 4.6; HGB 10.0;  , 000; ANC 2.9;    PE:  Constitutional:  he is sitting in a wheelchair due to fatigue. Other wise co-operative, here with his wife. Lymphatic: No palpable adenopathy in the neck/axillae. Chest: Clear to auscultation bilaterally  Cardiovascular: RRR. Abdomen: Soft? nontender? Extremities: No pitting edema. Skin: Mild to moderate bruising on the backs of the forearms. Neurological: Speech clear?  Alert/Oriented x3;

## 2017-02-20 NOTE — PROGRESS NOTES
Primary Nurse Regina Molina RN and Davey Bose RN performed a dual skin assessment on this patient No impairment noted  Chalino score is 16

## 2017-02-20 NOTE — PROGRESS NOTES
Spoke to MORGAN Rivera about re-ordering pt's home meds. Orders received for eliquis, roxicodone prn, and compazine prn.     1710 Notified Dr. Kimberlee Matos of pt's CXR results.

## 2017-02-21 NOTE — PROGRESS NOTES
Bedside shift change report given to [facility] (oncoming nurse) by Sima Zamorano (offgoing nurse). Report included the following information SBAR, Kardex, MAR and Recent Results.

## 2017-02-21 NOTE — PROGRESS NOTES
1000: Pt arrived to ICU; placed on overhead monitors; pt unresponsive on ventilator. Sinus tach with BBB. Levophed drip infusing at 8 mcg/min to maintain MAP of >65. Dr. Soni Ta at [facility]. Orders received. Pt wife at [facility] and updated on pt status. Primary Nurse Cherie Juarez and Rosalino Orlando RN performed a dual skin assessment on this patient No impairment noted; Chalino score is 8    1130: Dr. Soni Ta at [facility]; bronchoscopy complete      1200: Pt transported to CT with RN and RT; tolerated well      Shift Summary: Pt unresponsive on the ventilator; Pupils sluggish with upward gaze. EEG completed at [facility] today. Sinus tach with BBB. Levophed drip titrated to 1 mcg/min to maintain MAP of >65. Beside bronchoscopy complete. Pan cultures sent.  Tylenol administered x1 for temp of 100.4; urine output 30mL/hr

## 2017-02-21 NOTE — PROGRESS NOTES
Intubation Note    Called to bedside secondary to  Code Blue/ CPR. Patient pre-oxygenated with 100% oxygen. DVL x 1    8.0 ETT taped and secured at 24 cm at the teeth.    + Bilateral BS, + Chest rise, + ETCO2, and SaO2 stable throughtout. VSS. CXR pending.     Care turned over to covering Attending MD.

## 2017-02-21 NOTE — PROGRESS NOTES
Spiritual Care Assessment/Progress Notes    Mayank Perdue [id number]  [id number]    1945  80 y.o.  male    Patient Telephone Number: [phone] (home)   Mormon Affiliation: No Moravian   Language: English   Extended Emergency Contact Information  Primary Emergency Contact: Elaine  Address: [address] [place], [place] [address]Pradip  [address] [facility] Phone: [phone]  Mobile Phone: [phone]  Relation: Spouse   Patient Active Problem List    Diagnosis Date Noted    Diarrhea 02/20/2017    Dehydration 02/20/2017    Fatigue 02/20/2017    SOB (shortness of breath) 02/20/2017    Right inguinal hernia 11/23/2016    Iliac artery aneurysm, right ([facility] 10/12/2016    Hypotension 07/23/2016    Small cell carcinoma of left lung ([facility] 04/30/2016    Bladder cancer ([facility] 03/14/2014    Tobacco abuse 09/14/2012    FH: abdominal aortic aneurysm 09/14/2012    [facility] 09/27/2011    DJD (degenerative joint disease) 09/27/2011    Hypercholesterolemia 09/27/2011        Date: 2/21/2017       Level of Mormon/Spiritual Activity:  []         Involved in lux tradition/spiritual practice    []         Not involved in lux tradition/spiritual practice  []         Spiritually oriented    []         Claims no spiritual orientation    []         seeking spiritual identity  []         Feels alienated from Mandaeism practice/tradition  []         Feels angry about Mandaeism practice/tradition  []         Spirituality/Mandaeism tradition a resource for coping at this time.   [x]         Not able to assess due to medical condition    Services Provided Today:  []         crisis intervention    []         reading Scriptures  []         spiritual assessment    []         prayer  []         empathic listening/emotional support  []         rites and rituals (cite in comments)  []         life review     []         Mandaeism support  []         theological development   []         advocacy  []         ethical dialog     []         blessing  []         bereavement support    []         support to family  []         anticipatory grief support   []         help with AMD  []         spiritual guidance    []         meditation      Spiritual Care Needs  []         Emotional Support  []         Spiritual/Episcopal Care  []         Loss/Adjustment  []         Advocacy/Referral                /Ethics  []         No needs expressed at               this time  []         Other: (note in               comments)  [address]  []         Follow up visits with               pt/family  []         Provide materials  []         Schedule sacraments  []         Contact Community               Clergy  []         Follow up as needed  []         Other: (note in               comments)     Comments: Saw Mr Adalid Soto in ICU-03 for initial spiritual assessment. Mr Adalid Soto was intubated and on vent; no family was present at time of visit. Left a note at the bedside informing patient and family of  availability for support and of prayers on their behalf. Had silent prayer for patient and family. Plan: chaplains will attempt to visit when patient is more responsive or family is present; will remain available for patient/family support as needed/able. : Rev. Chucky Bhatti.  Sharifa Goodwin; to contact [phone] [address]akes call: [phone]

## 2017-02-21 NOTE — CONSULTS
[facility]        Medical Issues  -  has a past medical history of Bladder cancer ([facility] (3/14/2014); COPD (chronic obstructive pulmonary disease) ([facility] Dehydration (07/23/2016); DJD (degenerative joint disease) (9/27/2011); Hypercholesterolemia (9/27/2011); Left inguinal hernia (11/23/2016); Nephrolithiasis; Obesity; Pulmonary emboli ([facility] Right inguinal hernia (11/23/2016); S/P AAA (abdominal aortic aneurysm) repair (11-5-12); Small cell carcinoma of left lung ([facility] (4/30/2016); and Trauma (1982). IMPRESSION  / PLAN:    Intensive Care Unit [facility]    Patient arrives as a CODE BLUE acute cardiopulmonary arrest from the 6th floor. Laboratory data this morning showed blood sugar to be 180. White count 3.4 down from 12.6 in the fall. Hemoglobin is 8.5 and platelets 043. Chest x-ray this morning post code has been taken and is unavailable. Was noted to be in cardiopulmonary arrest at 8:55 AM and CPR was initiated and given fluid boluses and intubated. One amp of epinephrine and 1 amp of bicarbonate was given with return of spontaneous circulation. 7 minutes of downtime is noted. .  Due to his advanced carcinoma it was deemed that he would not be a grand candidate for a code ICE. He has extensive small cell carcinoma. .  Patient was seen to have vital signs being stable at morning assessment. Patient was then found later of unclear downtime. Examination of the patient shows him to be minimally responsive/no responsive pupils are pinpoint without reaction. He does not withdraw to pain. Spot echocardiogram shows RV not being dilated small pericardial effusion without tamponade and the left ventricle looks okay. No evidence pneumothorax seen on the chest x-ray - bilateral pleural effusions are seen. Chest x-ray yesterday would've suggested progression of the small cell carcinoma. We will add a CT of the chest abdomen and pelvis when he goes down for his head CT.   I'm very concerned about a devastating neurologic injury and there was an unclear time down before being discovered on the floor. Patient gravely ill due to his extensive small cell carcinoma and this arrest which seems to be a pulmonary induced issue due to his small cell carcinoma causing asphyxia. The patient is Critically ill  with Respiratory failure and at [address] for deterioration. Time spent was exclusive of any procedures, multidisciplinary rounds and did not overlap with another provider. Time Spent:  30 to 74 minutes  .   Wife updated at bedside      PROBLEM LIST:    Patient Active Problem List    Diagnosis Date Noted    Diarrhea 2017    Dehydration 2017    Fatigue 2017    SOB (shortness of breath) 2017    Right inguinal hernia 2016    Iliac artery aneurysm, right ([facility] 10/12/2016    Hypotension 2016    Small cell carcinoma of left lung ([facility] 2016    Bladder cancer ([facility] 2014    Tobacco abuse 2012    FH: abdominal aortic aneurysm 2012    COPD (chronic obstructive pulmonary disease) ([facility] 2011    DJD (degenerative joint disease) 2011    Hypercholesterolemia 2011          [address]: 2      History & review of systems Unable to obtain-patient encephalopathic on the ventilator    VITAL SIGNS:    Visit Vitals    /68 (BP 1 Location: Right arm, BP Patient Position: At rest)    Pulse (!) 117    Temp 98.1 °F (36.7 °C)    Resp 16    Ht 5' 5.5\" (1.664 m)    Wt 83 kg (182 lb 15.7 oz)    SpO2 96%    BMI 29.99 kg/m2        Temp (24hrs), Av.6 °F (37 °C), Min:97.5 °F (36.4 °C), Max:99.6 °F (37.6 °C)          Intake/Output Summary (Last 24 hours) at 17 1215  Last data filed at 17 1015   Gross per 24 hour   Intake          1900.48 ml   Output              375 ml   Net          1525.48 ml        EXAM:    General appearance: no distress  Eyes: sclera anicteric  ENT: endotracheal tube  Nodes: no adenopathy  Skin: no spider angiomata, jaundice, palmar erythema or xanthalasma  Respiratory: clear to auscultation bilaterally  Cardiovascular: regular heart rate, no murmurs, no JVD  Abdomen: soft, non-tender, liver size normal to percussion/palpation. Spleen not palpable. No obvious ascites  Extremities: no muscle wasting, no gross arthritic changes  : Right hernia scrotum  Neurologic: GCS 4-5 withdraws to pain    DATA:      Recent Labs      02/21/17   1135   WBC  5.9   HGB  9.9*   PLT  238     Recent Labs      02/21/17   0922   NA  142   K  3.3*   CL  99   CO2  30   GLU  174*   BUN  16   CREA  1.57*   CA  8.2*   MG  2.2   PHOS  5.9*   ALB  2.4*   SGOT  107*   ALT  93*       Ventilator / BiPAP / O2  O2 Device: Endotracheal tube (02/21/17 1015)    Mode:  Assist control  Rate:     TV: 500 ml  Pressure:    FiO2: 80 %  PEEP:  5 cm H20  PIP:  26 cm H2O  MV:  8.29 l/min    ABG    Recent Labs      02/21/17   1106   PHI  7.347*   PCO2I  57.6*   PO2I  80   HCO3I  31.6*   FIO2I  80         IMAGING    Endotracheal tube in good position   [x] Personally Visualized Film     [] Discussed with Radiologist    [] Report reviewed       Brii Chandler MD [facility]    PMH:  has a past medical history of Bladder cancer ([facility] (3/14/2014); COPD (chronic obstructive pulmonary disease) ([facility] Dehydration (07/23/2016); DJD (degenerative joint disease) (9/27/2011); Hypercholesterolemia (9/27/2011); Left inguinal hernia (11/23/2016); Nephrolithiasis; Obesity; Pulmonary emboli ([address] Right inguinal hernia (11/23/2016); S/P AAA (abdominal aortic aneurysm) repair (11-5-12); Small cell carcinoma of left lung ([address] (4/30/2016); and Trauma (1982). PSH:   has a past surgical history that includes tonsil and adenoidectomy; endoscopy, colon, diagnostic (2004); gi; heent; orthopaedic; vascular surgery procedure unlist (2012); other surgical; colonoscopy; urological (2014); and urological (2001).      FHX: family history includes Cancer in his father and sister; Heart Disease in his brother and mother; Other in his sister. SHX:  reports that he has been smoking. He has a 12.50 pack-year smoking history. He has never used smokeless tobacco. He reports that he drinks about 1.0 oz of alcohol per week  He reports that he does not use illicit drugs.     ALL:   Allergies   Allergen Reactions    Lovastatin Myalgia    Nitroglycerin Other (comments)     LOW BP FEET WERE SHAKING    Other Medication Other (comments)     Flu vaccine assoc'd with flu like illness    Pravastatin Myalgia    Zetia [Ezetimibe] Myalgia    Zocor [Simvastatin] Myalgia and Other (comments)     Foot pain        MEDS:   [x] Reviewed - As Below   [] Not reviewed    Current Facility-Administered Medications   Medication    NOREPINephrine (LEVOPHED) 8,000 mcg in dextrose 5% 250 mL infusion    chlorhexidine (PERIDEX) 0.12 % mouthwash 15 mL    piperacillin-tazobactam (ZOSYN) 3.375 g in 0.9% sodium chloride (MBP/ADV) 100 mL    famotidine (PF) (PEPCID) 20 mg in sodium chloride 0.9 % 10 mL injection    insulin lispro (HUMALOG) injection    glucose chewable tablet 16 g    dextrose (D50W) injection syrg 12.5-25 g    glucagon (GLUCAGEN) injection 1 mg    fentaNYL citrate (PF) injection  mcg    0.9% sodium chloride infusion    sodium chloride (NS) flush 10-30 mL    sodium chloride (NS) flush 10-40 mL    apixaban (ELIQUIS) tablet 5 mg        Yin Gonzalez MD [facility]

## 2017-02-21 NOTE — PROGRESS NOTES
Day #1 of Zosyn  Indication:  Aspiration PNA  Current regimen:  3.375 gm IV q 6 hours    Recent Labs      17   1135  17   0945  17   0922  17   0402   WBC  5.9  5.8   --   3.4*   CREA   --    --   1.57*  0.93   BUN   --    --   16  14     Est CrCl: 44 ml/min; UO: 0.4 ml/kg/hr  Temp (24hrs), Av.6 °F (37 °C), Min:97.5 °F (36.4 °C), Max:99.6 °F (37.6 °C)    Cultures:    blood - pending    Plan:   Dose adjusted to 3.375g IV q8h (4 hour infusion) for CrCl > 20 mL/min per extended interval protocol.

## 2017-02-21 NOTE — PROGRESS NOTES
Chief Complaint - follow-up and management of  Small cell carcinoma of lung   Exam  Gen No distress   HEENT No mucositis; no thrush; ET tube and PEG tube in   Nodes No supraclavicular or cervical adenopathy   Chest / line sites No inflammation   Lungs Decreased bilaterally   CV RRR   Abd Non-tender   Ext No edema   Skin No rashes   Neuro Unresponsive to pain; chews on tube; no movement of extremeties  +Babinski bilateral; pupils midposition with trace response to light; +ciliary reflex NL   Other    Time-based care: [] 25-35 min    [] > 35 mi     (Total time with >50% spent counseling/coordination)  Discussed with the following:  []     []  Nursing Staff  [] Consultant    []  Family   []  Other:  Active Medical Problems  Small cell lung cancer  Extensive mediastinal disese      Interim History and Assessment   He has advanced disease on 3rd line Rx. [place] is grim. Not sure what precipitated his arrest.  Would give ge 24 hours to start to recover, and if not much progress, initiate conversation with wife to deescalate care. Current medications, progress notes, vital signs, radiology, and labs reviewed.

## 2017-02-21 NOTE — PROGRESS NOTES
PCP c/s dict. Events noted. Pt seen , examined, d/w 'd Dr. Sultana Person. Prognosis guarded. Met w/ family . They would like to press on with full measures but understand that his prognosis may worsen.  They want to avoid suffering

## 2017-02-21 NOTE — PROCEDURES
Hospital: [facility]   Name: Jaime Piña   : 1945   MRN: [id number]   Code: Prior       Procedure:  Diagnostic bronchoscopy       Preoperative Diagnosis:  Acute respiratory failure with hypoxia and small cell cancer    Postoperative Diagnosis:   · ETT Good Position  · No endobronchial lesions  · Swelling and mild narrowing of left tracheobronchial tree    Specimens:  1. Bronchial washings were sent for  microbiology, AFB smear and culture and fungal culture    Procedure:   After answering all questions and full information as to risks, benefits, alternatives and goals, consent was obtained from [facility]. A timeout was taken at bedside in the presence of the operative team to confirm the patient's identity and planned procedure. There was agreement and the bronchscope was introduced through an endotracheal tube. Findings as noted below:    -- Trachea: ETT good position    -- Natasha: Normal    -- RUL:  Normal     -- RML:  Normal     -- RLL:  Normal    -- OLAMIDE  LLL:  Swelling and mild narrowing of left tracheobronchial tree      Complications: none           Disposition: ICU - intubated and critically ill. Condition: stable    ASA Score: ASA 3 - Severe systemic disease but compensated      Monitoring:  Vital signs monitored by an attending RN as well as end tidal C02 monitoring.     Mallampati Score: II (soft palate, uvula, fauces visible)    Estimated Blood Loss: None    Anesthesia: Patient on ventilator and receiving  no  meds       Implants:   none           Surgeon: Katherine Narvaez MD, [facility]

## 2017-02-21 NOTE — PROGRESS NOTES
Spiritual Care Assessment/Progress Notes    Morgan Ryder [id number]  [id number]    1945  80 y.o.  male    Patient Telephone Number: [phone] (home)   Confucianist Affiliation: No Orthodox   Language: English   Extended Emergency Contact Information  Primary Emergency Contact: Elaine  Address: [address] [place], [place] [address]radip  [address] [facility] Phone: [phone]  Mobile Phone: [phone]  Relation: Spouse   Patient Active Problem List    Diagnosis Date Noted    Diarrhea 02/20/2017    Dehydration 02/20/2017    Fatigue 02/20/2017    SOB (shortness of breath) 02/20/2017    Right inguinal hernia 11/23/2016    Iliac artery aneurysm, right ([facility] 10/12/2016    Hypotension 07/23/2016    Small cell carcinoma of left lung ([facility] 04/30/2016    [facility]    Tobacco abuse 09/14/2012    FH: abdominal aortic aneurysm 09/14/2012    COPD (chronic obstructive pulmonary disease) ([facility] 09/27/2011    DJD (degenerative joint disease) 09/27/2011    Hypercholesterolemia 09/27/2011        Date: 2/21/2017       Level of Confucianist/Spiritual Activity:  []         Involved in lux tradition/spiritual practice    []         Not involved in lux tradition/spiritual practice  []         Spiritually oriented    []         Claims no spiritual orientation    []         seeking spiritual identity  []         Feels alienated from Jehovah's witness practice/tradition  []         Feels angry about Jehovah's witness practice/tradition  [x]         Spirituality/Jehovah's witness tradition may be a resource for coping at this time.   [x]         Not able to assess due to medical condition    Services Provided Today:  []         crisis intervention    []         reading Scriptures  []         spiritual assessment    []         prayer  []         empathic listening/emotional support  []         rites and rituals (cite in comments)  []         life review     []         Jehovah's witness support  [] theological development   []         advocacy  []         ethical dialog     []         blessing  []         bereavement support    []         support to family  []         anticipatory grief support   []         help with AMD  []         spiritual guidance    []         meditation      Spiritual Care Needs  []         Emotional Support  []         Spiritual/Hindu Care  []         Loss/Adjustment  []         Advocacy/Referral                /Ethics  []         No needs expressed at               this time  [x]         Other: (note in               comments)  [address]  []         Follow up visits with               pt/family  []         Provide materials  []         Schedule sacraments  []         Contact Community               Clergy  []         Follow up as needed  [x]         Other: (note in               comments)     Attempted Initial Spiritual Assessment in 604-Oncology in response to code blue. Unable to assess patients needs due to compromised patient condition. No family present at this time. Provided wife's contact information to Nurse Tammy Phillip who called wife by phone. Reminded Tammy Phillip of  availability as needed upon wife's arrival.      Italo Zuluaga.  [facility]'s Staff  (Lucia Velázquez Patient Care Specialist)   Paging Service [address] [place] [phone])

## 2017-02-21 NOTE — PROGRESS NOTES
TRANSFER - OUT REPORT:    Verbal report given to Lela(name) on Stephen Brooke  being transferred to ICU(unit) for change in patient condition(no pulse, not breathing on assessment)       Report consisted of patients Situation, Background, Assessment and   Recommendations(SBAR). Information from the following report(s) SBAR and Kardex was reviewed with the receiving nurse. Lines:   Venous Access Device left chest port Upper chest (subclavicular area), left (Active)   Central Line Being Utilized Yes 2/20/2017  8:03 PM   Criteria for Appropriate Use Irritant/vesicant medication 2/20/2017  8:03 PM   Site Assessment Clean, dry, & intact 2/20/2017  8:03 PM   Date of Last Dressing Change 02/20/17 2/20/2017  8:03 PM   Dressing Status Clean, dry, & intact 2/20/2017  8:03 PM   Dressing Type Transparent 2/20/2017  8:03 PM   Date Accessed (Medial Site) 02/20/17 2/20/2017  8:03 PM   Access Needle Size (Site #1) 20 G 2/20/2017  8:03 PM   Access Needle Length (Medial Site) 0.75 inches 2/20/2017  8:03 PM   Positive Blood Return (Medial Site) Yes 2/20/2017  8:03 PM   Action Taken (Medial Site) Flushed 2/20/2017  8:03 PM   Alcohol Cap Used Yes 2/20/2017  8:03 PM        Opportunity for questions and clarification was provided.       Patient transported with:   Monitor  O2 @ 3 liters  Registered Nurse
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
## 2017-02-21 NOTE — CONSULTS
[address] [facility] [address]   [address] [facility]       Name:  Papi Melgar   MR#:  [id number]   :  1945   Account #:  [de-identified]    Date of Consultation:  2017   Date of Adm:  2017       REASON FOR CONSULTATION: Assistance with the management of   medical problems. ATTENDING PHYSICIAN:Obed Greene MD/JORGE ALBERTO No MD    IMPRESSIONS   1. Status post cardiopulmonary arrest.   2. Extensive small cell lung carcinoma. 3. Chronic obstructive pulmonary disease. 4. Status post prior abdominal aortic aneurysm repair. DISCUSSION: The patient's prognosis is guarded at present. I met   with his wife, Emmy Niece and 2 daughters, his grandson, his brother-  in-law and a close personal friend of his wife. The consensus now is   for full measures, recognizing that his prognosis is guarded. They   recognize that his prognosis may worsen. In the long run, they do not   want him to suffer. I agree with the current care, including intubation,   mechanical ventilation, Levophed for blood pressure support and IV   Zosyn. Thank you for asking me to see this patient. CHIEF COMPLAINT: A 59-year-old white male who is status post   cardiopulmonary arrest.    HISTORY OF PRESENT ILLNESS: He has extensive small cell lung   carcinoma which was diagnosed in 2016, at which time he was   found to have a left upper lung mass and extensive adenopathy   including left retrocrural adenopathy. He was incidentally found to have   a pulmonary embolism on staging imaging for which he was started on   anticoagulation. He was treated with palliative chemotherapy with   cisplatin and etoposide starting 2016, completing 4 cycles with   persistently depressed performance status and partial response in   2016.  He showed progression in the left upper lobe mass and   started Opdivo in 2017 and he is on weekly paclitaxel for   convincing signs of progression. He was seen yesterday by his   oncologist with extreme fatigue, increasing dyspnea, feeling dry and   starting having diarrhea again last week. He denies any blood in the   stool. He was admitted by the Oncology service for fluids and diarrhea   control. Then, this morning he was noted to be unresponsive. Code   Blue was called. He was revived. CPR was immediately initiated per   ACLS protocol and normal saline, intubation, 1 amp of epinephrine,   1 ampule of bicarbonate and his pulse was regained after 7 minutes. CPR was stopped. The patient was not responsive. He was intubated. He was transferred to the ICU. He was seen and evaluated by Carlene Uriostegui MD. A spot echocardiogram showed artery not being   dilated, small pericardial effusion without tamponade, left ventricle   looked satisfactory. Chest x-ray: No evidence of pneumothorax,   bilateral pleural effusions seen. Bronchoscopy showed some   narrowing of the left trachoebronchial tree. The patient was given Zosyn  and IV   fluids. He is currently unresponsive. A stat head CT was negative. PAST MEDICAL HISTORY: includes Small cell lung carcinoma, COPD,   pulmonary embolus, peripheral vascular disease, degenerative joint   Disease. Lipid disorder     PAST SURGICAL HISTORY: includes Abdominal aortic aneurysm repair, left   hip replacement. MEDICATIONS AT HOME   1. Eliquis 5 mg b.i.d.   2. Compazine 10 mg q.6h. p.r.n.   3. Oxycodone 10 mg q.4h. p.r.n.   4. Fenofibrate. 5. Tricor 48 mg every Monday and Friday. MEDICATIONS CURRENTLY   1. Eliquis 5 mg b.i.d.   2. Peridex mouthwash. 3. Pepcid 20 mg IV q.12h.   4. Humalog insulin sliding scale. 5. Zosyn 3.375 grams IV q.8 hours. 6. Sodium chloride 75 mL an hour. 7. Levophed currently at 4 mcg per minute. 8. He has p.r.n. D50, .   9. Fentanyl. PRN   10. Glucagon. PRN   11. Glucose tablets. PRN  12. Saline . PRN     ALLERGIES   1. LOVASTATIN. 2. NITROGLYCERIN. 3. FLU VACCINE. 4. PRAVASTATIN. 5. SIMVASTATIN. Evangelina Eden. FAMILY HISTORY: Strongly positive for abdominal aortic aneurysm. SOCIAL HISTORY: He has smoked much over the years. He is   . He has a brother who lives in [place]. He has a son who   lives in [place], but they have not come in yet. REVIEW OF SYSTEMS: As above. PHYSICAL EXAMINATION   GENERAL: White male appearing stated age, intubated. VITAL SIGNS: Temperature 99.1, pulse 119, sinus tachycardia,   respiratory rate 18. Saturation 98% on FIO2 of 80%. Blood pressure is   varied, including 177/89 recently. HEENT: Pupils approximately 3-2 mm, reactive to light bilaterally. NECK: Supple. LUNGS: Scattered expiratory wheezes. HEART: Regular without murmur, gallop or rub. ABDOMEN: BS positive, soft, no mass felt, no HSM, nontender. EXTREMITIES: Trace pedal edema bilaterally. NEUROLOGIC: He withdraws to deep pressure in the left upper   extremity and left lower extremity, but not on the right side for me. No   response to sternal rub. LABORATORY EVALUATION: WBC 5.9, hemoglobin 9.9, platelet   count 699. Urinalysis: WBCs 0-4, RBCs 0-5. Blood dip small. Lactic   acid 2.0. Blood cultures pending. From late this morning, potassium   3.3, BUN 16, creatinine 1.57 up from 0.93 creatinine at 0400 this morning . Troponin   I 0.07. BNP 89. Albumin 2.4, ALT 93,  up from 54 and 66   earlier this morning. Thank you very much for asking me to see the patient in consultation. I   will follow along with you. I spoke with the patient's family as   mentioned.         Zoe Dennison MD      [facility] / [facility]   D:  02/21/2017   13:49   T:  02/21/2017   14:45   Job #:  [id number]

## 2017-02-21 NOTE — PROGRESS NOTES
Respond Code blue, was called at 8:55 am on 2/21   CPR immediately initiated per ACLS protocol, Normal saline bolus running, and intubated  Received one amp of epinephrine and 1 ampule of bicarbonate  Pulse regained after 7 minutes, CPR stopped, V/S 138/73,  SaO2 93%  Patient not responsive  Plan  Stat CT head, CMP, Cardiac enzyme, CXR, and ABG  Continue IVF, Vent  Attending physician informed  Consult cardiology and his PCP   transfer to ICU

## 2017-02-21 NOTE — PROGRESS NOTES
Care Management: Chart reviewed. Patient admitted from MD office, where he presented for dehydration & uncontrolled diarrhea. He was admitted to floor for IVF & diarrhea control. Moved to ICU s/p Code Blue with CPR & is now critically ill with respiratory failure on ventilator. PMH: Small Cell Lung CA with weekly Taxol chemo, hx of PE, bladder cancer, COPD, DJD, Obesity, & s/p AAA repair. Family at bedside and per notes, they want to continue with full treatment. Care Management will continue to follow, but available as indicated.   YAZAN White BSN CCM

## 2017-02-21 NOTE — PROGRESS NOTES
Bedside shift change report given to Elsy (oncoming nurse) by Rosa Liao (offgoing nurse). Report included the following information SBAR and Kardex.

## 2017-02-21 NOTE — PROGRESS NOTES
Attended interdisciplinary rounds in ICU. : . Terrie Fang.  Ethel Palencia; to contact [phone] Fabrice Oakes call: [phone]

## 2017-02-22 NOTE — PROCEDURES
[address]   [facility] 12, [address]   EEG       Name:  Davonte Urrutia   MR#:  [id number]   :  1945   Account #:  [de-identified]    Date of Procedure:  2017   Date of Adm:  2017       HISTORY: This is a 80-year-old gentleman who had a   cardiopulmonary arrest earlier in the day who is intubated and   unresponsive. EEG is performed to assess brain function and rule out   seizure. MEDICATIONS:   1. Eliquis. 2. Levasaid. 3. Zocin. 4. Pepcid. 5. Insulin. CONDITIONS: This is a routine 21-channel digital EEG recording with   1 channel devoted to limited EKG performed in accordance with   International 10-20 system. The study was done on a comatose patient. Photic stimulation was performed as an activating procedure. DESCRIPTION: As the record opens, there is no discernible brain   activity seen with the sensitivity turned up to 2 microvolts. There is   questionable 6 Hz posterior rhythm seen in the T5-01 lead, but not   seen in the other occipital leads. It is noted that the T5 lead is out and   suspect that this is artifact. There is no reactivity to noxious stimuli. There is no normal sleep architecture seen. There were no epileptiform   discharges or electrographic seizures seen. INTERPRETATION: This is a markedly abnormal EEG with no clear   discernible electrocerebral activity over artifact. CLINICAL CORRELATION: Finding could be consistent with diffuse   anoxic brain injury but can also be seen due to medication effects. Clinical correlation is advised.               MD JAYLIN De La Torre / Greta Simeon   D:  2017   09:33   T:  2017   10:55   Job #:  [id number]

## 2017-02-22 NOTE — PROGRESS NOTES
2000 Bedside and Verbal shift change report given to Peggy Charles, RN (oncoming nurse) by Evangelina Heredia, RN (offgoing nurse). Report included the following information SBAR, Kardex, Procedure Summary, Intake/Output, MAR and Recent Results. 2000 Assumed care of pt. Pt not interactive on vent. VSS. Levophed gtt for goal MAP >65. Pt does not withdraw to pain; but opens mouth when attempting mouth care, bites on ETT; does not follow commands, but opens eyes to noxious stimuli. PERRLA ~3-4mm. (+) cough/gag with suction. [place]illon draining minimal amount, cloudy, tasha urine. NGT to low-wall suction, draining brown/blood tinged fluid. Levophed gtt for goal MAP >65.  0006 Pt with continued minimal urine output. Dr. Randi Davis paged. 0106 No return page. Second page sent out.  6952 Dr. Randi Davis returned call, updated on pt status, I/Os, minimal urine output. No new orders received. 0400 Pt withdraws BLE at this time. Eyes deviating downwards. Assessment otherwise unchanged. 3108 Dr. Mili Dickerson at bedside to evaluate pt. MD updated on pt status overnight, stable neuro assessments, decreased urine output. 0800 Bedside and Verbal shift change report given to Evangelina Heredia RN (oncoming nurse) by Peggy Charles, YAZAN (offgoing nurse). Report included the following information SBAR, Kardex, Procedure Summary, Intake/Output, MAR and Recent Results. Shift Summary S[facility] ICU status. Pt minimally responsive on vent. VSS. Levophed gtt OFF. Minimal urine output overnight- Mark Cartwright and Mili Dickerson aware.     Problem: Falls - Risk of  Goal: *Knowledge of fall prevention  Outcome: Not Progressing Towards Goal  Pt not interactive, not appropriate for teaching    Problem: Patient Education: Go to Patient Education Activity  Goal: Patient/Family Education  Outcome: Not Progressing Towards Goal  Pt not interactive, not appropriate for teaching    Problem: Patient Education: Go to Patient Education Activity  Goal: Patient/Family Education  Outcome: Not Progressing Towards Goal  Pt not interactive, not appropriate for teaching    Problem: Fluid Volume - Risk of, Imbalanced  Goal: *Balanced intake and output  Outcome: Not Progressing Towards Goal  Minimal urine output    Problem: Patient Education: Go to Patient Education Activity  Goal: Patient/Family Education  Outcome: Not Progressing Towards Goal  Pt not interactive, not appropriate for teaching    Problem: Patient Education: Go to Patient Education Activity  Goal: Patient/Family Education  Outcome: Not Progressing Towards Goal  Pt not interactive, not appropriate for teaching    Problem: General Medical Care Plan  Goal: *Optimize nutritional status  Outcome: Not Progressing Towards Goal  NPO; NGT to low-wall suction  Goal: *Progressive mobility and function (eg: ADLs)  Outcome: Not Progressing Towards Goal  Limited mobility; does not withdraw from pain    Problem: Patient Education: Go to Patient Education Activity  Goal: Patient/Family Education  Outcome: Not Progressing Towards Goal  Pt not interactive, not appropriate for teaching

## 2017-02-22 NOTE — ROUTINE PROCESS
Bedside and Verbal shift change report given to Mi Trivedi and Suzanne Phelan (oncoming nurse) by Ashley Love (offgoing nurse). Report included the following information SBAR, Kardex, Intake/Output, MAR and Recent Results.

## 2017-02-22 NOTE — PROGRESS NOTES
0800: assessment done; pt unresponsive on vent; eyes deviated downward; NSR with BBB; pt does not withdraw to pain; decreased urine output; thick secretions; pt bathed and resting comfortably; no evidence of pain. 0900: Pt GCS:3; [facility] was called; [facility] representative stated to call back when patient passes. 1140: notified by family that they are ready to withdraw care. Dr. Catarino Carrera aware. Com[facility] order received. 1200: Pt extubated; placed on 2L of O2 via NC; family support provided;  notified. 1220: Pt asystole on the monitor; absent heart sounds; apneic; Dr. Stu Anderson notified to pronounce death    200: Dr. Stu Anderson at beside to pronounce; Dr. Michael Botello notified; Dr. Edna Marin was notified; stated that she will notify him    [phone]: Spoke with Jesus Ferris of [facility]; pt not suitable for organ donation. [facility] will call back in regards to eye donation. 1400: Eye  Andrea Parker called and stated that patient will not be suitable for eye donation.

## 2017-02-22 NOTE — PROGRESS NOTES
Called to examine patient who has . No response to verbal and tactile stimuli. No respiratory effort. Absent heart sounds and pulses. Pupils fixed and dilated.    Patient pronounced dead on 2017 at 12: 20 PM  Family at bedside

## 2017-02-22 NOTE — PROGRESS NOTES
-Hematology / Oncology ([facility]) -    -CC- advanced small cell lung cancer    -S-   (intubated)    -O-    Patient Vitals for the past 24 hrs:   Temp Pulse Resp BP SpO2   02/22/17 0700 - 87 20 103/68 93 %   02/22/17 0600 - 89 11 122/71 94 %   02/22/17 0500 - 85 (!) 0 118/74 97 %   02/22/17 0400 96.9 °F (36.1 °C) 84 20 101/61 96 %   02/22/17 0356 - 83 20 - 96 %   02/22/17 0300 - 93 20 136/77 99 %   02/22/17 0200 - 87 20 107/68 99 %   02/22/17 0100 - 89 20 106/63 98 %   02/22/17 0000 99.6 °F (37.6 °C) 96 20 142/75 100 %   02/21/17 2300 - 92 20 141/78 99 %   02/21/17 2200 - 90 20 120/72 98 %   02/21/17 2100 - 95 20 123/73 97 %   02/21/17 2025 - 98 20 - 99 %   02/21/17 2000 100 °F (37.8 °C) 96 20 118/71 99 %   02/21/17 1900 - 97 20 115/71 99 %   02/21/17 1800 100 °F (37.8 °C) 96 20 111/70 99 %   02/21/17 1700 - 100 20 96/63 98 %   02/21/17 1600 100.4 °F (38 °C) (!) 103 20 131/75 97 %   02/21/17 1556 - (!) 103 20 - 98 %   02/21/17 1400 - (!) 119 19 (!) 147/93 98 %   02/21/17 1317 - (!) 119 18 - 98 %   02/21/17 1300 - (!) 115 20 136/82 99 %   02/21/17 1232 99.1 °F (37.3 °C) (!) 118 20 177/89 100 %   02/21/17 1100 - (!) 133 16 (!) 170/91 95 %   02/21/17 1015 98.1 °F (36.7 °C) (!) 117 16 137/68 96 %   02/21/17 1008 - (!) 115 16 - 91 %   02/21/17 1000 - (!) 118 18 136/76 95 %   02/21/17 0950 - (!) 116 16 119/90 91 %   02/21/17 0946 - (!) 109 - 90/59 93 %   02/21/17 0945 - - - (!) 83/41 97 %   02/21/17 0944 98.1 °F (36.7 °C) (!) 110 16 109/51 95 %   02/21/17 0934 - - - (!) 87/40 -   02/21/17 0931 - (!) 106 - (!) 61/40 -   02/21/17 0925 - (!) 104 - - 90 %   02/21/17 0924 - (!) 105 - - (!) 87 %   02/21/17 0921 - (!) 109 - - -   02/21/17 0920 - (!) 110 - 138/73 93 %   02/21/17 0917 - (!) 116 - 99/76 94 %   02/21/17 0914 - (!) 121 - 132/74 93 %   02/21/17 0912 - - (!) 126 - -        Gen: unresponsive  Eyes deviated down and L  Chest: bilateral breath sounds present  Cardiac: rrr  Abd: s/nt    -Labs-    Recent Labs      02/22/17   3447 02/21/17   1135  02/21/17   0945  02/21/17   0922  02/21/17   0402   WBC  4.8  5.9  5.8   --   3.4*   HGB  9.7*  9.9*  15.3   --   8.5*   PLT  207  238  131*   --   170   ANEU  3.1  4.1  2.4   --   1.2*   INR  1.2*   --    --    --    --    APTT  28.5   --    --    --    --    NA  143   --    --   142  139   K  3.4*   --    --   3.3*  3.6   GLU  113*   --    --   174*  77   BUN  21*   --    --   16  14   CREA  1.41*   --    --   1.57*  0.93   ALT  170*   --    --   93*  54   SGOT  167*   --    --   107*  66*   TBILI  0.8   --    --   0.7  0.5   AP  52   --    --   61  48   CA  8.5   --    --   8.2*  8.4*   MG  1.9   --    --   2.2   --    PHOS  3.7   --    --   5.9*   --        -Imaging-   1/9/17 CT chest/abd/pelvis notable for evidence of progressive disease including enlargement of the L lung mass (3.2x5.2) as well as enlargement of mediastinal adenopathy. Also very small L hydropneumothorax. 2/21- CT head - No acute intracranial hemorrhage, mass or infarct  -- CTA c/a/p- compared to 4/2016- 1. Worsening mediastinal lymphadenopathy. New retrocrural lymphadenopathy. Interval increase in size of left midlung soft tissue mass 7.1 cm x 6.2 cm.  2. Small amount of perihepatic free intraperitoneal fluid. 3. Bilateral pleural effusions. 2/22- cxr- Decreased left basilar opacity. Otherwise, stable exam including small pleural  effusions, right basilar opacity, mediastinal lymphadenopathy, and the left mid  lung mass.    -Summary- Mr. Daljit Thomas was diagnosed with extensive small cell lung cancer after biopsy on 4/27/16 of a OLAMIDE mass found to be accompanied by extensive adenopathy including L retrocrural adenopathy. He was also incidentally found to have had pulmonary embolism on staging imaging for which he was started on anticoagulation. Palliative chemotherapy with Cisplatin/etoposide was started 5/16/16. Completed 4 cycles with persistently depressed performance status and partial response.  In 10/2016, he showed progression of the L upper lobe mass and started Opdivo.  1/2017 showed further progression and started weekly paclitaxel.    -Assessment + Plan-     *) extensive stage SCLC- now on weekly paclitaxel (#6 2/20/17)  ----- CT at admission shows progressive disease  ----- now s/p code 2/21  ----- prognosis grim  ----- discussed with wife, family meeting planned later this am    Addendum- at family meeting, family agreeable to withdrawal after additional family to arrive

## 2017-02-22 NOTE — ROUTINE PROCESS
1000: TRANSFER - IN REPORT:    Verbal report received from Samara Fernandez (name) on Roxana Mathew  being received from [facility] (unit) for urgent transfer      Report consisted of patients Situation, Background, Assessment and   Recommendations(SBAR). Information from the following report(s) SBAR, Kardex, Intake/Output, MAR and Recent Results was reviewed with the receiving nurse. Opportunity for questions and clarification was provided.               1930: Bedside and Verbal shift change report given to Etter Cranker (oncoming nurse) by Yue Sanchez and Nasrin Alas (offgoing nurse).  Report included the following information SBAR, Kardex, Intake/Output, MAR, Recent Results and Cardiac Rhythm NSR with BBB with 1st Degree Heart Block.

## 2017-02-22 NOTE — PROGRESS NOTES
I checked with the RN about cutting off sedation so I can perform an SBT. RN said that an SBT couldn't be done because of his neurological condition.

## 2017-02-22 NOTE — PROGRESS NOTES
Chaplain tripp for death of patient on ICU. Family was self supporting and had clergy present. . No further support desired at this time. Kae Castañeda M.S., M.Div.   [address] [place] [address] ([phone])
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
## 2017-02-23 LAB
BACTERIA SPEC CULT: NORMAL
GRAM STN SPEC: NORMAL
SERVICE CMNT-IMP: NORMAL

## 2017-02-25 NOTE — DISCHARGE SUMMARY
Physician Discharge Summary     -Patient ID-  Driss Meraz  [id number]  33 y.o.  1945    -Admit date- 2017    -Discharge date and time- 17    -Admission Diagnoses- Dehydration, Fatigue, Diarrhea  Dehydration  Diarrhea  Fatigue  SOB (shortness of breath)    -Principal Diagnosis- small cell lung cancer    -Problem List- Active Problems:    Diarrhea (2017)      Dehydration (2017)      Fatigue (2017)      SOB (shortness of breath) (2017)        [facility] [place] [facility] Course- admitted with declining status on 3rd line tx for advanced SCLC (see H+P for details), patient found unresponsive in am on  after morning vitals, resuscitated, but remained unresponsive without sedation and with evidence suggestive of anoxic brain injury.   Care withdrawn , patient .    -Consults- primary care, critical care, neurology    -Significant Diagnostic Studies- eeg    -Discharge Exam-  Please see progress note     -Condition on Discharge-     -Signed-  Stu Mora MD  2017  9:47 AM

## 2017-02-26 LAB
BACTERIA SPEC CULT: NORMAL
SERVICE CMNT-IMP: NORMAL

## 2017-03-27 LAB
BACTERIA SPEC CULT: NORMAL
SERVICE CMNT-IMP: NORMAL

## 2017-04-07 LAB
ACID FAST STN SPEC: NEGATIVE
MYCOBACTERIUM SPEC QL CULT: NEGATIVE
SPECIMEN PREPARATION: NORMAL
SPECIMEN SOURCE: NORMAL